# Patient Record
Sex: MALE | Race: WHITE | Employment: FULL TIME | URBAN - METROPOLITAN AREA
[De-identification: names, ages, dates, MRNs, and addresses within clinical notes are randomized per-mention and may not be internally consistent; named-entity substitution may affect disease eponyms.]

---

## 2017-03-04 ENCOUNTER — HOSPITAL ENCOUNTER (OUTPATIENT)
Age: 42
Discharge: HOME | End: 2017-03-04
Attending: EMERGENCY MEDICINE
Payer: COMMERCIAL

## 2017-03-04 VITALS
RESPIRATION RATE: 18 BRPM | WEIGHT: 180 LBS | HEIGHT: 74 IN | SYSTOLIC BLOOD PRESSURE: 150 MMHG | OXYGEN SATURATION: 98 % | DIASTOLIC BLOOD PRESSURE: 82 MMHG | HEART RATE: 61 BPM | TEMPERATURE: 98 F | BODY MASS INDEX: 23.1 KG/M2

## 2017-03-04 DIAGNOSIS — Z48.02 ENCOUNTER FOR REMOVAL OF SUTURES: Primary | ICD-10-CM

## 2017-03-04 PROCEDURE — 99201 PR OUTPT EVAL AND MGNT NEW PT LEVEL 1: CPT

## 2017-03-04 PROCEDURE — 99201 HC OUTPT EVAL AND MGNT NEW PT LEVEL 1: CPT

## 2017-03-04 NOTE — ED INITIAL ASSESSMENT (HPI)
Pt presents to the IC with c/o a healed laceration in need of suture removal. No redness, inflammation, swelling, or pain to the site. Sutures were placed on Saturday s/p a trip and fall.

## 2017-03-04 NOTE — ED PROVIDER NOTES
Patient Seen in: [address]    History   [facility]  [facility]    Stated Complaint: sticthes removal    HPI    Is a 14-year-old male presents to immediate care for suture removal a O2 Device 03/04/17 1046 None (Room air)       Current:/82 mmHg  Pulse 61  Temp(Src) 97.9 °F (36.6 °C) (Oral)  Resp 18  Ht 188 cm (6' 2\")  Wt 81.647 kg  BMI 23.10 kg/m2  SpO2 98%        Physical Exam   Constitutional: He is oriented to person, place,

## 2017-10-06 ENCOUNTER — LAB ENCOUNTER (OUTPATIENT)
Dept: LAB | Age: 42
End: 2017-10-06
Attending: FAMILY MEDICINE
Payer: COMMERCIAL

## 2017-10-06 DIAGNOSIS — Z00.00 ROUTINE GENERAL MEDICAL EXAMINATION AT A HEALTH CARE FACILITY: Primary | ICD-10-CM

## 2017-10-06 PROCEDURE — 36415 COLL VENOUS BLD VENIPUNCTURE: CPT

## 2017-10-06 PROCEDURE — 80053 COMPREHEN METABOLIC PANEL: CPT

## (undated) NOTE — ED AVS SNAPSHOT
[place] [address]    Phone:  [phone]    Fax:  [address] [facility]   MRN: [id number]    Department:  [facility] AND [facility] in [facility]   Date of Visit:  3/4/ you.  You are our top priority. You were examined and treated today on an urgent basis only. This was not a substitute for ongoing medical care. Often, one Immediate Care visit does not uncover every injury or illness.  If you have been referred to a pr pertaining to these instructions have been answered in a satisfactory manner. [facility]        Pharmacy Address Phone Number   Isaías Lees [address] [facility] ([address]) [facility] ([address] [place] [address] your Zip Code and Date of Birth to complete the sign-up process. If you do not sign up before the expiration date, you must request a new code.     Your unique [employer] Access Code: [email or web address]  Expires: 5/3/2017 11:02 AM    If you have questions, you can ca